# Patient Record
Sex: FEMALE | Race: WHITE | NOT HISPANIC OR LATINO | URBAN - METROPOLITAN AREA
[De-identification: names, ages, dates, MRNs, and addresses within clinical notes are randomized per-mention and may not be internally consistent; named-entity substitution may affect disease eponyms.]

---

## 2020-07-30 ENCOUNTER — APPOINTMENT (RX ONLY)
Dept: URBAN - METROPOLITAN AREA CLINIC 310 | Facility: CLINIC | Age: 55
Setting detail: DERMATOLOGY
End: 2020-07-30

## 2020-07-30 VITALS — TEMPERATURE: 98.5 F

## 2020-07-30 DIAGNOSIS — I83.9 ASYMPTOMATIC VARICOSE VEINS OF LOWER EXTREMITIES: ICD-10-CM

## 2020-07-30 DIAGNOSIS — L57.0 ACTINIC KERATOSIS: ICD-10-CM

## 2020-07-30 PROBLEM — I83.93 ASYMPTOMATIC VARICOSE VEINS OF BILATERAL LOWER EXTREMITIES: Status: ACTIVE | Noted: 2020-07-30

## 2020-07-30 PROCEDURE — 17000 DESTRUCT PREMALG LESION: CPT

## 2020-07-30 PROCEDURE — 17003 DESTRUCT PREMALG LES 2-14: CPT

## 2020-07-30 PROCEDURE — 99202 OFFICE O/P NEW SF 15 MIN: CPT | Mod: 25

## 2020-07-30 PROCEDURE — ? DEFER

## 2020-07-30 PROCEDURE — ? LIQUID NITROGEN

## 2020-07-30 ASSESSMENT — LOCATION SIMPLE DESCRIPTION DERM
LOCATION SIMPLE: RIGHT CHEEK
LOCATION SIMPLE: LEFT CHEEK
LOCATION SIMPLE: LEFT PRETIBIAL REGION
LOCATION SIMPLE: RIGHT THIGH

## 2020-07-30 ASSESSMENT — LOCATION ZONE DERM
LOCATION ZONE: FACE
LOCATION ZONE: LEG

## 2020-07-30 ASSESSMENT — LOCATION DETAILED DESCRIPTION DERM
LOCATION DETAILED: RIGHT SUPERIOR MEDIAL BUCCAL CHEEK
LOCATION DETAILED: LEFT INFERIOR CENTRAL MALAR CHEEK
LOCATION DETAILED: RIGHT ANTERIOR DISTAL THIGH
LOCATION DETAILED: LEFT PROXIMAL PRETIBIAL REGION

## 2020-07-30 NOTE — PROCEDURE: LIQUID NITROGEN
Post-Care Instructions: I reviewed with the patient in detail post-care instructions. Patient is to wear sunprotection, and avoid picking at any of the treated lesions. Pt may apply Vaseline to crusted or scabbing areas.
Render Note In Bullet Format When Appropriate: No
Number Of Freeze-Thaw Cycles: 1 freeze-thaw cycle
Duration Of Freeze Thaw-Cycle (Seconds): 7
Consent: The patient's consent was obtained including but not limited to risks of crusting, scabbing, blistering, scarring, darker or lighter pigmentary change, recurrence, incomplete removal and infection.
Detail Level: Detailed

## 2020-07-31 ENCOUNTER — RX ONLY (OUTPATIENT)
Age: 55
Setting detail: RX ONLY
End: 2020-07-31

## 2020-07-31 RX ORDER — HYDROQUINONE 4% 4 G/100G
EMULSION TOPICAL
Qty: 1 | Refills: 0 | COMMUNITY
Start: 2020-07-31

## 2022-03-29 ENCOUNTER — APPOINTMENT (RX ONLY)
Dept: URBAN - METROPOLITAN AREA CLINIC 310 | Facility: CLINIC | Age: 57
Setting detail: DERMATOLOGY
End: 2022-03-29

## 2022-03-29 DIAGNOSIS — L65.0 TELOGEN EFFLUVIUM: ICD-10-CM

## 2022-03-29 DIAGNOSIS — K13.0 DISEASES OF LIPS: ICD-10-CM

## 2022-03-29 PROCEDURE — ? TREATMENT REGIMEN

## 2022-03-29 PROCEDURE — 99213 OFFICE O/P EST LOW 20 MIN: CPT

## 2022-03-29 PROCEDURE — ? ORDER TESTS

## 2022-03-29 ASSESSMENT — LOCATION DETAILED DESCRIPTION DERM
LOCATION DETAILED: LEFT SUPERIOR VERMILION LIP
LOCATION DETAILED: RIGHT MEDIAL FRONTAL SCALP

## 2022-03-29 ASSESSMENT — LOCATION SIMPLE DESCRIPTION DERM
LOCATION SIMPLE: RIGHT SCALP
LOCATION SIMPLE: LEFT LIP

## 2022-03-29 ASSESSMENT — LOCATION ZONE DERM
LOCATION ZONE: LIP
LOCATION ZONE: SCALP

## 2022-03-29 NOTE — HPI: HAIR LOSS
Previous Labs: No
How Did The Hair Loss Occur?: sudden in onset
Additional History: Pt is here for hair loss located on the scalp.

## 2022-03-29 NOTE — PROCEDURE: ORDER TESTS
Performing Laboratory: 0
Bill For Surgical Tray: no
Expected Date Of Service: 03/29/2022
Billing Type: Third-Party Bill

## 2022-03-31 ENCOUNTER — RX ONLY (OUTPATIENT)
Age: 57
Setting detail: RX ONLY
End: 2022-03-31

## 2022-03-31 RX ORDER — SPIRONOLACTONE 100 MG/1
TABLET, FILM COATED ORAL
Qty: 30 | Refills: 2 | Status: ERX | COMMUNITY
Start: 2022-03-31

## 2024-06-27 ENCOUNTER — APPOINTMENT (RX ONLY)
Dept: URBAN - METROPOLITAN AREA CLINIC 310 | Facility: CLINIC | Age: 59
Setting detail: DERMATOLOGY
End: 2024-06-27

## 2024-06-27 DIAGNOSIS — L82.1 OTHER SEBORRHEIC KERATOSIS: ICD-10-CM

## 2024-06-27 DIAGNOSIS — L57.0 ACTINIC KERATOSIS: ICD-10-CM

## 2024-06-27 PROCEDURE — ? LIQUID NITROGEN

## 2024-06-27 PROCEDURE — 17000 DESTRUCT PREMALG LESION: CPT

## 2024-06-27 PROCEDURE — 99212 OFFICE O/P EST SF 10 MIN: CPT | Mod: 25

## 2024-06-27 ASSESSMENT — LOCATION DETAILED DESCRIPTION DERM
LOCATION DETAILED: LEFT MID TEMPLE
LOCATION DETAILED: LEFT ANTERIOR PROXIMAL THIGH

## 2024-06-27 ASSESSMENT — LOCATION SIMPLE DESCRIPTION DERM
LOCATION SIMPLE: LEFT TEMPLE
LOCATION SIMPLE: LEFT THIGH

## 2024-06-27 ASSESSMENT — LOCATION ZONE DERM
LOCATION ZONE: LEG
LOCATION ZONE: FACE

## 2024-06-27 NOTE — PROCEDURE: LIQUID NITROGEN
Show Applicator Variable?: Yes
Application Tool (Optional): Cry-AC
Duration Of Freeze Thaw-Cycle (Seconds): 8
Render Note In Bullet Format When Appropriate: No
Post-Care Instructions: I reviewed with the patient in detail post-care instructions. Patient is to wear sunprotection, and avoid picking at any of the treated lesions. Pt may apply Vaseline to crusted or scabbing areas.
Consent: The patient's consent was obtained including but not limited to risks of crusting, scabbing, blistering, scarring, darker or lighter pigmentary change, recurrence, incomplete removal and infection.
Number Of Freeze-Thaw Cycles: 1 freeze-thaw cycle
Detail Level: Detailed

## 2024-10-07 ENCOUNTER — HOSPITAL ENCOUNTER (EMERGENCY)
Facility: HOSPITAL | Age: 59
Discharge: HOME | End: 2024-10-08
Attending: EMERGENCY MEDICINE
Payer: COMMERCIAL

## 2024-10-07 DIAGNOSIS — N23 RENAL COLIC ON LEFT SIDE: ICD-10-CM

## 2024-10-07 DIAGNOSIS — N13.2 HYDRONEPHROSIS CONCURRENT WITH AND DUE TO CALCULI OF KIDNEY AND URETER: Primary | ICD-10-CM

## 2024-10-07 DIAGNOSIS — N20.0 LEFT NEPHROLITHIASIS: ICD-10-CM

## 2024-10-07 DIAGNOSIS — R11.2 NAUSEA AND VOMITING: ICD-10-CM

## 2024-10-07 PROCEDURE — 99285 EMERGENCY DEPT VISIT HI MDM: CPT | Performed by: EMERGENCY MEDICINE

## 2024-10-07 PROCEDURE — 36415 COLL VENOUS BLD VENIPUNCTURE: CPT | Performed by: EMERGENCY MEDICINE

## 2024-10-07 PROCEDURE — 83690 ASSAY OF LIPASE: CPT | Performed by: EMERGENCY MEDICINE

## 2024-10-07 PROCEDURE — 96374 THER/PROPH/DIAG INJ IV PUSH: CPT

## 2024-10-07 PROCEDURE — 99284 EMERGENCY DEPT VISIT MOD MDM: CPT | Performed by: EMERGENCY MEDICINE

## 2024-10-07 PROCEDURE — 80053 COMPREHEN METABOLIC PANEL: CPT | Performed by: EMERGENCY MEDICINE

## 2024-10-07 PROCEDURE — 96375 TX/PRO/DX INJ NEW DRUG ADDON: CPT

## 2024-10-07 PROCEDURE — 85025 COMPLETE CBC W/AUTO DIFF WBC: CPT | Performed by: EMERGENCY MEDICINE

## 2024-10-07 PROCEDURE — 6360000200 HC RX 636 W HCPCS (ALT 250 FOR IP): Performed by: EMERGENCY MEDICINE

## 2024-10-07 RX ORDER — MORPHINE SULFATE 4 MG/ML
4 INJECTION, SOLUTION INTRAMUSCULAR; INTRAVENOUS ONCE
Status: COMPLETED | OUTPATIENT
Start: 2024-10-08 | End: 2024-10-07

## 2024-10-07 RX ORDER — SODIUM CHLORIDE 9 MG/ML
1000 INJECTION, SOLUTION INTRAVENOUS ONCE
Status: COMPLETED | OUTPATIENT
Start: 2024-10-08 | End: 2024-10-08

## 2024-10-07 RX ORDER — KETOROLAC TROMETHAMINE 30 MG/ML
15 INJECTION, SOLUTION INTRAMUSCULAR; INTRAVENOUS ONCE
Status: COMPLETED | OUTPATIENT
Start: 2024-10-08 | End: 2024-10-07

## 2024-10-07 RX ORDER — SODIUM CHLORIDE 9 MG/ML
25-50 INJECTION, SOLUTION INTRAVENOUS AS NEEDED
Status: DISCONTINUED | OUTPATIENT
Start: 2024-10-07 | End: 2024-10-08 | Stop reason: HOSPADM

## 2024-10-07 RX ORDER — ONDANSETRON HYDROCHLORIDE 2 MG/ML
4 INJECTION, SOLUTION INTRAVENOUS ONCE
Status: COMPLETED | OUTPATIENT
Start: 2024-10-08 | End: 2024-10-07

## 2024-10-07 RX ADMIN — ONDANSETRON 4 MG: 2 INJECTION, SOLUTION INTRAMUSCULAR; INTRAVENOUS at 23:59

## 2024-10-07 RX ADMIN — KETOROLAC TROMETHAMINE 15 MG: 30 INJECTION, SOLUTION INTRAMUSCULAR; INTRAVENOUS at 23:59

## 2024-10-07 RX ADMIN — MORPHINE SULFATE 4 MG: 4 INJECTION, SOLUTION INTRAMUSCULAR; INTRAVENOUS at 23:59

## 2024-10-08 ENCOUNTER — APPOINTMENT (OUTPATIENT)
Dept: CT IMAGING | Facility: HOSPITAL | Age: 59
End: 2024-10-08
Payer: COMMERCIAL

## 2024-10-08 VITALS
BODY MASS INDEX: 30.21 KG/M2 | OXYGEN SATURATION: 96 % | TEMPERATURE: 98.2 F | HEART RATE: 76 BPM | DIASTOLIC BLOOD PRESSURE: 72 MMHG | WEIGHT: 160 LBS | RESPIRATION RATE: 14 BRPM | HEIGHT: 61 IN | SYSTOLIC BLOOD PRESSURE: 130 MMHG

## 2024-10-08 LAB
ALBUMIN SERPL-MCNC: 4.1 G/DL (ref 3.5–5.3)
ALP SERPL-CCNC: 96 U/L (ref 46–118)
ALT SERPL-CCNC: 16 U/L (ref 7–52)
ANION GAP SERPL CALC-SCNC: 13 MMOL/L (ref 3–11)
AST SERPL-CCNC: 21 U/L
BACTERIA #/AREA URNS HPF: ABNORMAL /HPF
BASOPHILS # BLD AUTO: 0.2 10*3/UL
BASOPHILS NFR BLD AUTO: 1.5 % (ref 0–2)
BILIRUB SERPL-MCNC: 0.53 MG/DL (ref 0.2–1.4)
BILIRUB UR QL: NEGATIVE
BUN SERPL-MCNC: 25 MG/DL (ref 7–25)
CALCIUM ALBUM COR SERPL-MCNC: 9.1 MG/DL (ref 8.6–10.3)
CALCIUM SERPL-MCNC: 9.2 MG/DL (ref 8.6–10.3)
CHLORIDE SERPL-SCNC: 105 MMOL/L (ref 98–107)
CLARITY UR: CLEAR
CO2 SERPL-SCNC: 20 MMOL/L (ref 21–32)
COLOR UR: YELLOW
CREAT SERPL-MCNC: 1.06 MG/DL (ref 0.6–1.1)
EGFRCR SERPLBLD CKD-EPI 2021: 60 ML/MIN/1.73M*2
EOSINOPHIL # BLD AUTO: 0.1 10*3/UL
EOSINOPHIL NFR BLD AUTO: 0.7 % (ref 0–3)
ERYTHROCYTE [DISTWIDTH] IN BLOOD BY AUTOMATED COUNT: 13.2 % (ref 11.5–14)
GLUCOSE SERPL-MCNC: 103 MG/DL (ref 70–105)
GLUCOSE UR QL: NEGATIVE MG/DL
HCT VFR BLD AUTO: 45.1 % (ref 34–45)
HGB BLD-MCNC: 14.7 G/DL (ref 11.5–15.5)
HGB UR QL: ABNORMAL
KETONES UR-MCNC: 15 MG/DL
LEUKOCYTE ESTERASE UR QL STRIP: ABNORMAL
LIPASE SERPL-CCNC: 26 U/L (ref 11–82)
LYMPHOCYTES # BLD AUTO: 2.7 10*3/UL
LYMPHOCYTES NFR BLD AUTO: 22.6 % (ref 11–47)
MCH RBC QN AUTO: 28.9 PG (ref 28–33)
MCHC RBC AUTO-ENTMCNC: 32.7 G/DL (ref 32–36)
MCV RBC AUTO: 88.5 FL (ref 81–97)
MONOCYTES # BLD AUTO: 0.9 10*3/UL
MONOCYTES NFR BLD AUTO: 7.2 % (ref 3–11)
NEUTROPHILS # BLD AUTO: 8.2 10*3/UL
NEUTROPHILS NFR BLD AUTO: 68 % (ref 41–81)
NITRITE UR QL: NEGATIVE
PH UR: 7.5 PH
PLATELET # BLD AUTO: 292 10*3/UL (ref 140–350)
PMV BLD AUTO: 7.8 FL (ref 6.9–10.8)
POTASSIUM SERPL-SCNC: 4 MMOL/L (ref 3.5–5.1)
PROT SERPL-MCNC: 6.8 G/DL (ref 6–8.3)
PROT UR STRIP-MCNC: NEGATIVE MG/DL
RBC # BLD AUTO: 5.1 10*6/UL (ref 3.7–5.3)
RBC #/AREA URNS HPF: ABNORMAL /HPF
SODIUM SERPL-SCNC: 138 MMOL/L (ref 135–145)
SP GR UR: 1.02 (ref 1–1.03)
SQUAMOUS #/AREA URNS HPF: ABNORMAL /HPF
UROBILINOGEN UR-MCNC: 0.2 MG/DL
WBC # BLD AUTO: 12 10*3/UL (ref 4.5–10.5)
WBC #/AREA URNS HPF: ABNORMAL /HPF

## 2024-10-08 PROCEDURE — 74176 CT ABD & PELVIS W/O CONTRAST: CPT

## 2024-10-08 PROCEDURE — 87077 CULTURE AEROBIC IDENTIFY: CPT | Performed by: EMERGENCY MEDICINE

## 2024-10-08 PROCEDURE — 96375 TX/PRO/DX INJ NEW DRUG ADDON: CPT

## 2024-10-08 PROCEDURE — 81001 URINALYSIS AUTO W/SCOPE: CPT | Performed by: EMERGENCY MEDICINE

## 2024-10-08 PROCEDURE — 99284 EMERGENCY DEPT VISIT MOD MDM: CPT | Performed by: EMERGENCY MEDICINE

## 2024-10-08 PROCEDURE — 2580000300 HC RX 258: Performed by: EMERGENCY MEDICINE

## 2024-10-08 PROCEDURE — 96361 HYDRATE IV INFUSION ADD-ON: CPT

## 2024-10-08 PROCEDURE — 6360000200 HC RX 636 W HCPCS (ALT 250 FOR IP): Performed by: EMERGENCY MEDICINE

## 2024-10-08 RX ORDER — ONDANSETRON HCL 4 MG
1 TABLET ORAL ONCE
Status: COMPLETED | OUTPATIENT
Start: 2024-10-08 | End: 2024-10-08

## 2024-10-08 RX ORDER — TAMSULOSIN HYDROCHLORIDE 0.4 MG/1
0.4 CAPSULE ORAL DAILY
Qty: 7 CAPSULE | Refills: 0 | Status: SHIPPED | OUTPATIENT
Start: 2024-10-08 | End: 2024-10-08 | Stop reason: ENTERED-IN-ERROR

## 2024-10-08 RX ORDER — HYDROCODONE BITARTRATE AND ACETAMINOPHEN 5; 325 MG/1; MG/1
1 TABLET ORAL ONCE
Status: COMPLETED | OUTPATIENT
Start: 2024-10-08 | End: 2024-10-08

## 2024-10-08 RX ORDER — METOCLOPRAMIDE HYDROCHLORIDE 5 MG/ML
10 INJECTION INTRAMUSCULAR; INTRAVENOUS ONCE
Status: COMPLETED | OUTPATIENT
Start: 2024-10-08 | End: 2024-10-08

## 2024-10-08 RX ORDER — OMEPRAZOLE 40 MG/1
40 CAPSULE, DELAYED RELEASE ORAL DAILY
COMMUNITY

## 2024-10-08 RX ORDER — LEVOTHYROXINE SODIUM 50 UG/1
50 TABLET ORAL DAILY
COMMUNITY

## 2024-10-08 RX ORDER — TAMSULOSIN HYDROCHLORIDE 0.4 MG/1
0.4 CAPSULE ORAL DAILY
Qty: 7 CAPSULE | Refills: 0 | Status: SHIPPED | OUTPATIENT
Start: 2024-10-08 | End: 2024-10-15

## 2024-10-08 RX ORDER — HYDROMORPHONE HYDROCHLORIDE 1 MG/ML
1 INJECTION, SOLUTION INTRAMUSCULAR; INTRAVENOUS; SUBCUTANEOUS ONCE
Status: COMPLETED | OUTPATIENT
Start: 2024-10-08 | End: 2024-10-08

## 2024-10-08 RX ORDER — DIPHENHYDRAMINE HYDROCHLORIDE 50 MG/ML
25 INJECTION INTRAMUSCULAR; INTRAVENOUS ONCE
Status: COMPLETED | OUTPATIENT
Start: 2024-10-08 | End: 2024-10-08

## 2024-10-08 RX ADMIN — Medication 1 PACKAGE: at 01:10

## 2024-10-08 RX ADMIN — HYDROMORPHONE HYDROCHLORIDE 1 MG: 1 INJECTION, SOLUTION INTRAMUSCULAR; INTRAVENOUS; SUBCUTANEOUS at 01:06

## 2024-10-08 RX ADMIN — SODIUM CHLORIDE 1000 ML: 9 INJECTION, SOLUTION INTRAVENOUS at 00:00

## 2024-10-08 RX ADMIN — METOCLOPRAMIDE 10 MG: 5 INJECTION, SOLUTION INTRAMUSCULAR; INTRAVENOUS at 01:04

## 2024-10-08 RX ADMIN — DIPHENHYDRAMINE HYDROCHLORIDE 25 MG: 50 INJECTION, SOLUTION INTRAMUSCULAR; INTRAVENOUS at 01:05

## 2024-10-08 RX ADMIN — HYDROCODONE BITARTRATE AND ACETAMINOPHEN 1 PACKAGE: 5; 325 TABLET ORAL at 01:10

## 2024-10-08 ASSESSMENT — ENCOUNTER SYMPTOMS
CONSTITUTIONAL NEGATIVE: 1
NEUROLOGICAL NEGATIVE: 1
CARDIOVASCULAR NEGATIVE: 1
VOMITING: 1
DIARRHEA: 0
NAUSEA: 1
ABDOMINAL DISTENTION: 0
RESPIRATORY NEGATIVE: 1
ABDOMINAL PAIN: 0
FLANK PAIN: 1

## 2024-10-08 NOTE — DISCHARGE INSTRUCTIONS
Prescription for Tamsulosin (Flomax) 0.4mg to be picked up at the pharmacy. Take 1 daily for 7days.    Meds dispensed from the ED: Ondansetron (Zofran) 4mg - May dissolve 1 under the tongue every 8hours as needed for nausea  Hydrocodone/Acetaminophen (Norco) 5/325mg - May take 1 every 4-6hours as needed for pain

## 2024-10-08 NOTE — ED PROVIDER NOTES
HPI:  Chief Complaint   Patient presents with    Abdominal Pain     Started approx. 1700    Back Pain     From the abdomen       Patient arrived by by private vehicle, ambulatory, and accompanied by spouse/SO  Other sources of history: Spouse    Patient is a 59-year-old female presents the ER with the onset of left-sided flank pain rating to the left groin since 5 PM pain coming in waves with nausea and vomiting unable to get comfortable no exacerbating relieving factors no fevers no urinary complaints has ever had pain like this in the past no history of any trauma no diarrhea no fevers    HISTORY:  Past Medical History:   Diagnosis Date    Disease of thyroid gland        Past Surgical History:   Procedure Laterality Date    GLUTEUS MANAN MUSCLE TRANSFER      SHOULDER ARTHROSCOPY W/ LABRAL REPAIR         History reviewed. No pertinent family history.    Social History     Tobacco Use    Smoking status: Never    Smokeless tobacco: Never   Vaping Use    Vaping status: Never Used   Substance Use Topics    Alcohol use: Yes     Comment: Rare    Drug use: Never         ROS:  Review of Systems   Constitutional: Negative.    Respiratory: Negative.     Cardiovascular: Negative.    Gastrointestinal:  Positive for nausea and vomiting. Negative for abdominal distention, abdominal pain and diarrhea.   Genitourinary:  Positive for flank pain. Negative for vaginal bleeding, vaginal discharge and vaginal pain.   Neurological: Negative.    All other systems reviewed and are negative.    Except as documented per HPI    PE:  ED Triage Vitals   Temp Heart Rate Resp BP SpO2   10/07/24 2334 10/07/24 2334 10/07/24 2334 10/07/24 2334 10/07/24 2334   36.8 °C (98.2 °F) 87 24 130/90 100 %      Mean BP (mmHg) Temp Source Heart Rate Source Patient Position BP Location   -- 10/07/24 2334 10/08/24 0203 10/07/24 2334 10/07/24 2334    Oral Monitor In bed Left arm      FiO2 (%)       --                Hypoxia:N    Physical Exam  Vitals and  nursing note reviewed.   HENT:      Head: Normocephalic and atraumatic.      Nose: Nose normal.      Mouth/Throat:      Mouth: Mucous membranes are moist.      Pharynx: Oropharynx is clear.   Eyes:      Extraocular Movements: Extraocular movements intact.      Conjunctiva/sclera: Conjunctivae normal.      Pupils: Pupils are equal, round, and reactive to light.   Cardiovascular:      Rate and Rhythm: Normal rate and regular rhythm.      Pulses: Normal pulses.   Pulmonary:      Effort: Pulmonary effort is normal.      Breath sounds: Normal breath sounds.   Abdominal:      General: Bowel sounds are normal.      Palpations: Abdomen is soft.      Tenderness: There is no abdominal tenderness.   Musculoskeletal:         General: Normal range of motion.      Cervical back: Normal range of motion and neck supple.   Skin:     General: Skin is warm.      Capillary Refill: Capillary refill takes less than 2 seconds.   Neurological:      General: No focal deficit present.      Mental Status: She is alert and oriented to person, place, and time.         ED LABS:  Labs Reviewed   CBC WITH AUTO DIFFERENTIAL - Abnormal       Result Value    WBC 12.0 (*)     RBC 5.10      Hemoglobin 14.7      Hematocrit 45.1 (*)     MCV 88.5      MCH 28.9      MCHC 32.7      RDW 13.2      Platelets 292      MPV 7.8      Neutrophils% 68.0      Lymphocytes% 22.6      Monocytes% 7.2      Eosinophils% 0.7      Basophils% 1.5      ANC (auto diff) 8.20      Lymphocytes Absolute 2.70      Monocytes Absolute 0.90      Eosinophils Absolute 0.10      Basophils Absolute 0.20     COMPREHENSIVE METABOLIC PANEL - Abnormal    Sodium 138      Potassium 4.0      Chloride 105      CO2 20 (*)     Anion Gap 13 (*)     BUN 25      Creatinine 1.06      Glucose 103      Calcium 9.2      AST 21      ALT (SGPT) 16      Alkaline Phosphatase 96      Total Protein 6.8      Albumin 4.1      Total Bilirubin 0.53      Corrected Calcium 9.1      eGFR 60 (*)     Narrative:      Calculation based on the 2021 Chronic Kidney Disease Epidemiology Collaboration (CKD-EPI) equation refit without adjustment for race.   URINALYSIS DIPSTICK REFLEX TO CULTURE FOR USE WITH MICROSCOPIC PANEL - Abnormal    Color, Urine Yellow      Clarity, Urine Clear      pH, Urine 7.5      Specific Gravity, Urine 1.020      Protein, Urine Negative      Glucose, Urine Negative      Ketones, Urine 15 (*)     Blood, Urine Large (*)     Nitrite, Urine Negative      Bilirubin, Urine Negative      Leukocytes, Urine Small (*)     Urobilinogen, Urine 0.2     URINALYSIS MICROSCOPIC, REFLEX CULTURE - Abnormal    RBC, Urine 6-9 (*)     WBC, Urine 5-9 (*)     Squamous Epithelial, Urine 0-4      Bacteria, Urine Few     LIPASE - Normal    Lipase 26     URINE CULTURE   URINALYSIS WITH MICROSCOPIC, REFLEX CULTURE    Narrative:     The following orders were created for panel order Urinalysis w/microscopic, reflex culture Urine, Clean Catch.  Procedure                               Abnormality         Status                     ---------                               -----------         ------                     Urinalysis, Dip (part 1 ...[[id number]]  Abnormal            Final result               Urinalysis, Micro (part ...[[id number]]  Abnormal            Final result                 Please view results for these tests on the individual orders.       Laboratory data reviewed    ED IMAGES:  CT abdomen pelvis renal stone protocol   Final Result   IMPRESSION:   1. 4 mm obstructive proximal left ureteral stone with mild left-sided hydronephrosis and proximal left hydroureter. Mild left perinephric fat stranding likely inflammatory in nature.   2. Additional 4 mm nonobstructive left lower pole calyceal tip stone.   3. No right-sided hydronephrosis with no right-sided urinary calculi.            REPORT NOTE:   This study has been reviewed by [facility] teleradiology service.  Preliminary report placed in EPIC for review by referring  "provider, at the time of the exam. No major discrepancies.              Radiology images reviewed and agree with radiologist interpretation  Discussion with radiology:N       Rhythm strip reviewed no ectopy no arrhythmias      ED PROCEDURES:  Procedures    ED COURSE:  /72 (BP Location: Right arm, Patient Position: Sitting)   Pulse 76   Temp 36.8 °C (98.2 °F) (Oral)   Resp 14   Ht 1.549 m (5' 1\")   Wt 72.6 kg (160 lb)   SpO2 96%   BMI 30.23 kg/m²     ED Course as of 10/08/24 0744   Tue Oct 08, 2024   0104 Patient CT scan is consistent with nephrolithiasis and has a kidney stone in her right renal pelvis and another one that appears to be at the UVJ.  Patient initially got morphine Toradol Zofran and IV fluid with limited improvement of her symptoms and then received Reglan Benadryl and 1 of Dilaudid.  Urinalysis shows blood with no signs of infection labs otherwise unremarkable here.  Patient will be discharged with Norco Zofran and a prescription for Flomax advised to follow-up with urology when she returns home and advised regarding signs to return.  Patient and family are comfortable to discharge plan [JM]      ED Course User Index  [JM] Wiley Echavarria MD           Sepsis Quality Bundle     ED MEDICATIONS:  Medications   ondansetron (ZOFRAN) injection 4 mg (4 mg intravenous Given 10/7/24 2359)   sodium chloride 0.9 % bolus 1,000 mL (0 mL intravenous Stopped 10/8/24 0100)   morphine injection 4 mg (4 mg intravenous Given 10/7/24 2359)   ketorolac (TORADOL) injection 15 mg (15 mg intravenous Given 10/7/24 2359)   HYDROmorphone (DILAUDID) injection 1 mg (1 mg intravenous Given 10/8/24 0106)   diphenhydrAMINE (BENADRYL) injection 25 mg (25 mg intravenous Given 10/8/24 0105)   metoclopramide (REGLAN) injection 10 mg (10 mg intravenous Given 10/8/24 0104)   HYDROcodone-acetaminophen (NORCO) 5-325 mg Disp: #12 tablet - ED DOSE PACK 1 Package (1 Package oral ED take home pack 10/8/24 0110)   ondansetron " (ZOFRAN) 4 mg Disp: #6 tablet - ED DOSE PACK 1 Package (1 Package oral ED take home pack 10/8/24 0110)        MDM:  D Dx: Pyelonephritis, renal colic, muscle strain, diverticulitis  Admission Considered: Yes  Review of previous records: None available  Discussion with other Clinicians: None  Tests considered: None  Prescriptions considered: None  Chronic conditions affecting care: see Pmhx   Care affected by social determinants of health: None    Patient is a 59-year-old female who is here from out of town from [place] presents ER with left flank pain rating to the left groin with nausea and vomiting started this evening patient has no exacerbating or relieving factors pain is not reproducible on exam but appears uncomfortable most likely renal colic.  Pyelonephritis diverticulitis as well patient has no history of intra-abdominal surgeries will give morphine Toradol Zofran will give IV fluid here will get labs urinalysis CT scan without contrast and reevaluate.  Patient is a clinical signs of sepsis no signs of surgical abdomen at this time  Final diagnoses:   [N13.2] Hydronephrosis concurrent with and due to calculi of kidney and ureter   [R11.2] Nausea and vomiting   [N23] Renal colic on left side   [N20.0] Left nephrolithiasis     10/8/2024  1:03 AM                      Wiley Echavarria MD  10/08/24 0775

## 2024-10-09 LAB — BACTERIA UR CULT: ABNORMAL
